# Patient Record
Sex: MALE | Race: BLACK OR AFRICAN AMERICAN | Employment: FULL TIME | URBAN - METROPOLITAN AREA
[De-identification: names, ages, dates, MRNs, and addresses within clinical notes are randomized per-mention and may not be internally consistent; named-entity substitution may affect disease eponyms.]

---

## 2018-10-12 ENCOUNTER — HOSPITAL ENCOUNTER (EMERGENCY)
Age: 28
Discharge: HOME | End: 2018-10-12

## 2018-10-12 VITALS
HEIGHT: 71 IN | WEIGHT: 156 LBS | HEART RATE: 78 BPM | SYSTOLIC BLOOD PRESSURE: 122 MMHG | DIASTOLIC BLOOD PRESSURE: 67 MMHG | OXYGEN SATURATION: 96 % | TEMPERATURE: 98.4 F | BODY MASS INDEX: 21.84 KG/M2 | RESPIRATION RATE: 14 BRPM

## 2018-10-12 DIAGNOSIS — Z20.2 STD EXPOSURE: Primary | ICD-10-CM

## 2018-10-12 PROCEDURE — 6370000000 HC RX 637 (ALT 250 FOR IP): Performed by: PHYSICIAN ASSISTANT

## 2018-10-12 PROCEDURE — 99282 EMERGENCY DEPT VISIT SF MDM: CPT

## 2018-10-12 PROCEDURE — 6360000002 HC RX W HCPCS: Performed by: PHYSICIAN ASSISTANT

## 2018-10-12 PROCEDURE — 96372 THER/PROPH/DIAG INJ SC/IM: CPT

## 2018-10-12 PROCEDURE — 2500000003 HC RX 250 WO HCPCS: Performed by: PHYSICIAN ASSISTANT

## 2018-10-12 RX ORDER — AZITHROMYCIN 250 MG/1
1000 TABLET, FILM COATED ORAL ONCE
Status: COMPLETED | OUTPATIENT
Start: 2018-10-12 | End: 2018-10-12

## 2018-10-12 RX ADMIN — CEFTRIAXONE SODIUM 250 MG: 250 INJECTION, POWDER, FOR SOLUTION INTRAMUSCULAR; INTRAVENOUS at 14:04

## 2018-10-12 RX ADMIN — AZITHROMYCIN 1000 MG: 250 TABLET, FILM COATED ORAL at 14:03

## 2018-12-18 ENCOUNTER — HOSPITAL ENCOUNTER (EMERGENCY)
Age: 28
Discharge: HOME | End: 2018-12-18
Attending: EMERGENCY MEDICINE

## 2018-12-18 VITALS
RESPIRATION RATE: 18 BRPM | DIASTOLIC BLOOD PRESSURE: 88 MMHG | TEMPERATURE: 98 F | BODY MASS INDEX: 21.84 KG/M2 | OXYGEN SATURATION: 99 % | HEIGHT: 71 IN | WEIGHT: 156 LBS | HEART RATE: 76 BPM | SYSTOLIC BLOOD PRESSURE: 122 MMHG

## 2018-12-18 DIAGNOSIS — K52.9 GASTROENTERITIS: Primary | ICD-10-CM

## 2018-12-18 LAB
ALBUMIN SERPL-MCNC: 5.2 GM/DL (ref 3.4–5)
ALP BLD-CCNC: 77 IU/L (ref 40–128)
ALT SERPL-CCNC: 21 U/L (ref 10–40)
ANION GAP SERPL CALCULATED.3IONS-SCNC: 15 MMOL/L (ref 4–16)
AST SERPL-CCNC: 26 IU/L (ref 15–37)
BASOPHILS ABSOLUTE: 0 K/CU MM
BASOPHILS RELATIVE PERCENT: 0.4 % (ref 0–1)
BILIRUB SERPL-MCNC: 1 MG/DL (ref 0–1)
BUN BLDV-MCNC: 12 MG/DL (ref 6–23)
CALCIUM SERPL-MCNC: 10 MG/DL (ref 8.3–10.6)
CHLORIDE BLD-SCNC: 100 MMOL/L (ref 99–110)
CO2: 24 MMOL/L (ref 21–32)
CREAT SERPL-MCNC: 1 MG/DL (ref 0.9–1.3)
DIFFERENTIAL TYPE: ABNORMAL
EOSINOPHILS ABSOLUTE: 0.1 K/CU MM
EOSINOPHILS RELATIVE PERCENT: 0.8 % (ref 0–3)
GFR AFRICAN AMERICAN: >60 ML/MIN/1.73M2
GFR NON-AFRICAN AMERICAN: >60 ML/MIN/1.73M2
GLUCOSE BLD-MCNC: 98 MG/DL (ref 70–99)
HCT VFR BLD CALC: 47 % (ref 42–52)
HEMOGLOBIN: 15.7 GM/DL (ref 13.5–18)
IMMATURE NEUTROPHIL %: 0.3 % (ref 0–0.43)
LIPASE: 16 IU/L (ref 13–60)
LYMPHOCYTES ABSOLUTE: 0.6 K/CU MM
LYMPHOCYTES RELATIVE PERCENT: 8.7 % (ref 24–44)
MCH RBC QN AUTO: 31.3 PG (ref 27–31)
MCHC RBC AUTO-ENTMCNC: 33.4 % (ref 32–36)
MCV RBC AUTO: 93.6 FL (ref 78–100)
MONOCYTES ABSOLUTE: 0.6 K/CU MM
MONOCYTES RELATIVE PERCENT: 8.6 % (ref 0–4)
NUCLEATED RBC %: 0 %
PDW BLD-RTO: 11.9 % (ref 11.7–14.9)
PLATELET # BLD: 220 K/CU MM (ref 140–440)
PMV BLD AUTO: 10.8 FL (ref 7.5–11.1)
POTASSIUM SERPL-SCNC: 3.5 MMOL/L (ref 3.5–5.1)
RBC # BLD: 5.02 M/CU MM (ref 4.6–6.2)
SEGMENTED NEUTROPHILS ABSOLUTE COUNT: 5.9 K/CU MM
SEGMENTED NEUTROPHILS RELATIVE PERCENT: 81.2 % (ref 36–66)
SODIUM BLD-SCNC: 139 MMOL/L (ref 135–145)
TOTAL IMMATURE NEUTOROPHIL: 0.02 K/CU MM
TOTAL NUCLEATED RBC: 0 K/CU MM
TOTAL PROTEIN: 8.5 GM/DL (ref 6.4–8.2)
WBC # BLD: 7.2 K/CU MM (ref 4–10.5)

## 2018-12-18 PROCEDURE — 96375 TX/PRO/DX INJ NEW DRUG ADDON: CPT

## 2018-12-18 PROCEDURE — 99284 EMERGENCY DEPT VISIT MOD MDM: CPT

## 2018-12-18 PROCEDURE — 96376 TX/PRO/DX INJ SAME DRUG ADON: CPT

## 2018-12-18 PROCEDURE — 80053 COMPREHEN METABOLIC PANEL: CPT

## 2018-12-18 PROCEDURE — S0028 INJECTION, FAMOTIDINE, 20 MG: HCPCS | Performed by: EMERGENCY MEDICINE

## 2018-12-18 PROCEDURE — 2580000003 HC RX 258: Performed by: EMERGENCY MEDICINE

## 2018-12-18 PROCEDURE — 96361 HYDRATE IV INFUSION ADD-ON: CPT

## 2018-12-18 PROCEDURE — 6360000002 HC RX W HCPCS: Performed by: EMERGENCY MEDICINE

## 2018-12-18 PROCEDURE — 6370000000 HC RX 637 (ALT 250 FOR IP): Performed by: EMERGENCY MEDICINE

## 2018-12-18 PROCEDURE — 83690 ASSAY OF LIPASE: CPT

## 2018-12-18 PROCEDURE — 85025 COMPLETE CBC W/AUTO DIFF WBC: CPT

## 2018-12-18 PROCEDURE — 96374 THER/PROPH/DIAG INJ IV PUSH: CPT

## 2018-12-18 PROCEDURE — 2500000003 HC RX 250 WO HCPCS: Performed by: EMERGENCY MEDICINE

## 2018-12-18 RX ORDER — 0.9 % SODIUM CHLORIDE 0.9 %
1000 INTRAVENOUS SOLUTION INTRAVENOUS ONCE
Status: COMPLETED | OUTPATIENT
Start: 2018-12-18 | End: 2018-12-18

## 2018-12-18 RX ORDER — ONDANSETRON 2 MG/ML
4 INJECTION INTRAMUSCULAR; INTRAVENOUS EVERY 30 MIN PRN
Status: COMPLETED | OUTPATIENT
Start: 2018-12-18 | End: 2018-12-18

## 2018-12-18 RX ORDER — BENZONATATE 100 MG/1
200 CAPSULE ORAL ONCE
Status: COMPLETED | OUTPATIENT
Start: 2018-12-18 | End: 2018-12-18

## 2018-12-18 RX ORDER — ONDANSETRON 4 MG/1
4 TABLET, ORALLY DISINTEGRATING ORAL EVERY 8 HOURS PRN
Qty: 15 TABLET | Refills: 0 | Status: SHIPPED | OUTPATIENT
Start: 2018-12-18 | End: 2019-05-02

## 2018-12-18 RX ORDER — FAMOTIDINE 20 MG/1
20 TABLET, FILM COATED ORAL 2 TIMES DAILY
Qty: 40 TABLET | Refills: 1 | Status: SHIPPED | OUTPATIENT
Start: 2018-12-18

## 2018-12-18 RX ORDER — HYDROCODONE BITARTRATE AND ACETAMINOPHEN 5; 325 MG/1; MG/1
1 TABLET ORAL ONCE
Status: COMPLETED | OUTPATIENT
Start: 2018-12-18 | End: 2018-12-18

## 2018-12-18 RX ORDER — MAGNESIUM HYDROXIDE/ALUMINUM HYDROXICE/SIMETHICONE 120; 1200; 1200 MG/30ML; MG/30ML; MG/30ML
30 SUSPENSION ORAL ONCE
Status: COMPLETED | OUTPATIENT
Start: 2018-12-18 | End: 2018-12-18

## 2018-12-18 RX ADMIN — ONDANSETRON 4 MG: 2 INJECTION INTRAMUSCULAR; INTRAVENOUS at 02:22

## 2018-12-18 RX ADMIN — BENZONATATE 200 MG: 100 CAPSULE ORAL at 04:05

## 2018-12-18 RX ADMIN — ONDANSETRON 4 MG: 2 INJECTION INTRAMUSCULAR; INTRAVENOUS at 05:50

## 2018-12-18 RX ADMIN — ALUMINUM HYDROXIDE, MAGNESIUM HYDROXIDE, AND SIMETHICONE 30 ML: 200; 200; 20 SUSPENSION ORAL at 02:55

## 2018-12-18 RX ADMIN — FAMOTIDINE 20 MG: 10 INJECTION, SOLUTION INTRAVENOUS at 02:55

## 2018-12-18 RX ADMIN — ONDANSETRON 4 MG: 2 INJECTION INTRAMUSCULAR; INTRAVENOUS at 04:05

## 2018-12-18 RX ADMIN — HYDROCODONE BITARTRATE AND ACETAMINOPHEN 1 TABLET: 5; 325 TABLET ORAL at 04:05

## 2018-12-18 RX ADMIN — SODIUM CHLORIDE 1000 ML: 9 INJECTION, SOLUTION INTRAVENOUS at 02:55

## 2018-12-18 ASSESSMENT — PAIN DESCRIPTION - LOCATION: LOCATION: ABDOMEN

## 2018-12-18 ASSESSMENT — PAIN SCALES - GENERAL
PAINLEVEL_OUTOF10: 8
PAINLEVEL_OUTOF10: 8

## 2018-12-18 ASSESSMENT — PAIN DESCRIPTION - PAIN TYPE: TYPE: ACUTE PAIN

## 2018-12-18 NOTE — ED PROVIDER NOTES
specified. Disposition medications (if applicable):  Discharge Medication List as of 12/18/2018  6:12 AM      START taking these medications    Details   ondansetron (ZOFRAN ODT) 4 MG disintegrating tablet Take 1 tablet by mouth every 8 hours as needed for Nausea, Disp-15 tablet, R-0Print      famotidine (PEPCID) 20 MG tablet Take 1 tablet by mouth 2 times daily, Disp-40 tablet, R-1Print             Comment: Please note this report has been produced using speech recognition software and may contain errors related to that system including errors in grammar, punctuation, and spelling, as well as words and phrases that may be inappropriate. If there are any questions or concerns please feel free to contact the dictating provider for clarification.      Franklin Villarreal MD  12/18/18 5153

## 2019-01-09 ENCOUNTER — HOSPITAL ENCOUNTER (EMERGENCY)
Age: 29
Discharge: HOME | End: 2019-01-09

## 2019-01-09 VITALS
DIASTOLIC BLOOD PRESSURE: 89 MMHG | TEMPERATURE: 98 F | BODY MASS INDEX: 22.33 KG/M2 | WEIGHT: 156 LBS | SYSTOLIC BLOOD PRESSURE: 131 MMHG | OXYGEN SATURATION: 100 % | HEART RATE: 55 BPM | RESPIRATION RATE: 16 BRPM | HEIGHT: 70 IN

## 2019-01-09 DIAGNOSIS — K61.1 PERIRECTAL ABSCESS: Primary | ICD-10-CM

## 2019-01-09 PROCEDURE — 99282 EMERGENCY DEPT VISIT SF MDM: CPT

## 2019-01-09 RX ORDER — CHLORHEXIDINE GLUCONATE 4 G/100ML
SOLUTION TOPICAL
Qty: 1 BOTTLE | Refills: 1 | Status: SHIPPED | OUTPATIENT
Start: 2019-01-09 | End: 2019-01-23

## 2019-01-09 RX ORDER — DOXYCYCLINE 100 MG/1
100 TABLET ORAL 2 TIMES DAILY
Qty: 20 TABLET | Refills: 0 | Status: SHIPPED | OUTPATIENT
Start: 2019-01-09 | End: 2019-01-19

## 2019-01-09 RX ORDER — TRAMADOL HYDROCHLORIDE 50 MG/1
50 TABLET ORAL EVERY 6 HOURS PRN
Qty: 10 TABLET | Refills: 0 | Status: SHIPPED | OUTPATIENT
Start: 2019-01-09 | End: 2019-01-14

## 2019-01-09 ASSESSMENT — PAIN DESCRIPTION - PAIN TYPE: TYPE: ACUTE PAIN

## 2019-01-09 ASSESSMENT — PAIN SCALES - GENERAL: PAINLEVEL_OUTOF10: 6

## 2019-01-09 ASSESSMENT — PAIN DESCRIPTION - LOCATION: LOCATION: BUTTOCKS

## 2019-01-09 ASSESSMENT — PAIN DESCRIPTION - ORIENTATION: ORIENTATION: RIGHT

## 2019-05-02 ENCOUNTER — HOSPITAL ENCOUNTER (EMERGENCY)
Age: 29
Discharge: HOME | End: 2019-05-02
Attending: EMERGENCY MEDICINE

## 2019-05-02 VITALS
HEART RATE: 80 BPM | RESPIRATION RATE: 16 BRPM | OXYGEN SATURATION: 100 % | BODY MASS INDEX: 22.33 KG/M2 | SYSTOLIC BLOOD PRESSURE: 124 MMHG | DIASTOLIC BLOOD PRESSURE: 67 MMHG | HEIGHT: 70 IN | TEMPERATURE: 97.9 F | WEIGHT: 156 LBS

## 2019-05-02 DIAGNOSIS — R19.7 NAUSEA VOMITING AND DIARRHEA: Primary | ICD-10-CM

## 2019-05-02 DIAGNOSIS — R11.2 NAUSEA VOMITING AND DIARRHEA: Primary | ICD-10-CM

## 2019-05-02 LAB
ALBUMIN SERPL-MCNC: 4.9 GM/DL (ref 3.4–5)
ALP BLD-CCNC: 76 IU/L (ref 40–129)
ALT SERPL-CCNC: 19 U/L (ref 10–40)
ANION GAP SERPL CALCULATED.3IONS-SCNC: 17 MMOL/L (ref 4–16)
AST SERPL-CCNC: 22 IU/L (ref 15–37)
BASOPHILS ABSOLUTE: 0 K/CU MM
BASOPHILS RELATIVE PERCENT: 0.1 % (ref 0–1)
BILIRUB SERPL-MCNC: 1.1 MG/DL (ref 0–1)
BUN BLDV-MCNC: 16 MG/DL (ref 6–23)
CALCIUM SERPL-MCNC: 10 MG/DL (ref 8.3–10.6)
CHLORIDE BLD-SCNC: 100 MMOL/L (ref 99–110)
CO2: 20 MMOL/L (ref 21–32)
CREAT SERPL-MCNC: 1 MG/DL (ref 0.9–1.3)
DIFFERENTIAL TYPE: ABNORMAL
EOSINOPHILS ABSOLUTE: 0 K/CU MM
EOSINOPHILS RELATIVE PERCENT: 0.1 % (ref 0–3)
GFR AFRICAN AMERICAN: >60 ML/MIN/1.73M2
GFR NON-AFRICAN AMERICAN: >60 ML/MIN/1.73M2
GLUCOSE BLD-MCNC: 106 MG/DL (ref 70–99)
HCT VFR BLD CALC: 50.9 % (ref 42–52)
HEMOGLOBIN: 16.8 GM/DL (ref 13.5–18)
IMMATURE NEUTROPHIL %: 0.4 % (ref 0–0.43)
LIPASE: 16 IU/L (ref 13–60)
LYMPHOCYTES ABSOLUTE: 0.4 K/CU MM
LYMPHOCYTES RELATIVE PERCENT: 3.7 % (ref 24–44)
MCH RBC QN AUTO: 30.9 PG (ref 27–31)
MCHC RBC AUTO-ENTMCNC: 33 % (ref 32–36)
MCV RBC AUTO: 93.7 FL (ref 78–100)
MONOCYTES ABSOLUTE: 0.5 K/CU MM
MONOCYTES RELATIVE PERCENT: 4.7 % (ref 0–4)
NUCLEATED RBC %: 0 %
PDW BLD-RTO: 12.4 % (ref 11.7–14.9)
PLATELET # BLD: 191 K/CU MM (ref 140–440)
PMV BLD AUTO: 10.7 FL (ref 7.5–11.1)
POTASSIUM SERPL-SCNC: 4.5 MMOL/L (ref 3.5–5.1)
RBC # BLD: 5.43 M/CU MM (ref 4.6–6.2)
SEGMENTED NEUTROPHILS ABSOLUTE COUNT: 9 K/CU MM
SEGMENTED NEUTROPHILS RELATIVE PERCENT: 91 % (ref 36–66)
SODIUM BLD-SCNC: 137 MMOL/L (ref 135–145)
TOTAL IMMATURE NEUTOROPHIL: 0.04 K/CU MM
TOTAL NUCLEATED RBC: 0 K/CU MM
TOTAL PROTEIN: 8.7 GM/DL (ref 6.4–8.2)
WBC # BLD: 9.9 K/CU MM (ref 4–10.5)

## 2019-05-02 PROCEDURE — 2580000003 HC RX 258: Performed by: EMERGENCY MEDICINE

## 2019-05-02 PROCEDURE — 96374 THER/PROPH/DIAG INJ IV PUSH: CPT

## 2019-05-02 PROCEDURE — 36415 COLL VENOUS BLD VENIPUNCTURE: CPT

## 2019-05-02 PROCEDURE — 96361 HYDRATE IV INFUSION ADD-ON: CPT

## 2019-05-02 PROCEDURE — 83690 ASSAY OF LIPASE: CPT

## 2019-05-02 PROCEDURE — 6360000002 HC RX W HCPCS: Performed by: EMERGENCY MEDICINE

## 2019-05-02 PROCEDURE — 80053 COMPREHEN METABOLIC PANEL: CPT

## 2019-05-02 PROCEDURE — 99284 EMERGENCY DEPT VISIT MOD MDM: CPT

## 2019-05-02 PROCEDURE — 85025 COMPLETE CBC W/AUTO DIFF WBC: CPT

## 2019-05-02 RX ORDER — 0.9 % SODIUM CHLORIDE 0.9 %
1000 INTRAVENOUS SOLUTION INTRAVENOUS ONCE
Status: COMPLETED | OUTPATIENT
Start: 2019-05-02 | End: 2019-05-02

## 2019-05-02 RX ORDER — ONDANSETRON 2 MG/ML
4 INJECTION INTRAMUSCULAR; INTRAVENOUS EVERY 30 MIN PRN
Status: DISCONTINUED | OUTPATIENT
Start: 2019-05-02 | End: 2019-05-02

## 2019-05-02 RX ORDER — DICYCLOMINE HYDROCHLORIDE 10 MG/1
10 CAPSULE ORAL 3 TIMES DAILY
Qty: 15 CAPSULE | Refills: 3 | Status: SHIPPED | OUTPATIENT
Start: 2019-05-02

## 2019-05-02 RX ORDER — KETOROLAC TROMETHAMINE 30 MG/ML
30 INJECTION, SOLUTION INTRAMUSCULAR; INTRAVENOUS ONCE
Status: DISCONTINUED | OUTPATIENT
Start: 2019-05-02 | End: 2019-05-02

## 2019-05-02 RX ORDER — ONDANSETRON 2 MG/ML
4 INJECTION INTRAMUSCULAR; INTRAVENOUS ONCE
Status: COMPLETED | OUTPATIENT
Start: 2019-05-02 | End: 2019-05-02

## 2019-05-02 RX ORDER — ONDANSETRON 4 MG/1
4 TABLET, ORALLY DISINTEGRATING ORAL EVERY 8 HOURS PRN
Qty: 15 TABLET | Refills: 0 | Status: SHIPPED | OUTPATIENT
Start: 2019-05-02

## 2019-05-02 RX ADMIN — SODIUM CHLORIDE 1000 ML: 9 INJECTION, SOLUTION INTRAVENOUS at 13:23

## 2019-05-02 RX ADMIN — ONDANSETRON 4 MG: 2 INJECTION INTRAMUSCULAR; INTRAVENOUS at 13:23

## 2019-05-02 ASSESSMENT — PAIN SCALES - GENERAL: PAINLEVEL_OUTOF10: 9

## 2019-05-02 NOTE — ED PROVIDER NOTES
Triage Chief Complaint:   Emesis (with N/V/D onset last night. also c/o generalized weakness)    Jamul:  Iza Jenkins is a 25 y.o. male that presents Nausea vomiting and diarrhea, started this morning, no recent travel or sick contacts. He has crampy pain throughout his abdomen, has body aches, decreased oral intake, taken nothing for treatment    ROS:  General:  No fevers, no chills  Eyes:   no discharge  ENT:  No sore throat, no nasal congestion  Cardiovascular:  No chest pain  Respiratory:  No shortness of breath, no cough  Gastrointestinal:  + pain,  nausea,  vomiting, and diarrhea  Musculoskeletal:  No muscle pain, no joint pain  Skin:  No rash, no pruritis  Neurologic:   no headache  Genitourinary:  No dysuria  Endocrine:  No unexpected weight gain, no unexpected weight loss  Extremities:  no edema, no pain    Past Medical History:   Diagnosis Date    History of fracture of finger     History of wrist fracture      Past Surgical History:   Procedure Laterality Date    ABDOMEN SURGERY      OTHER SURGICAL HISTORY Right [id number]    I and D with irrigation of perianal abcess rt buttock    WRIST TENODESIS       History reviewed. No pertinent family history.   Social History     Socioeconomic History    Marital status: Single     Spouse name: Not on file    Number of children: Not on file    Years of education: Not on file    Highest education level: Not on file   Occupational History    Not on file   Social Needs    Financial resource strain: Not on file    Food insecurity:     Worry: Not on file     Inability: Not on file    Transportation needs:     Medical: Not on file     Non-medical: Not on file   Tobacco Use    Smoking status: Current Every Day Smoker     Packs/day: 0.25     Types: Cigarettes    Smokeless tobacco: Never Used   Substance and Sexual Activity    Alcohol use: Yes     Comment: occ    Drug use: Yes     Types: Marijuana     Comment: daily    Sexual activity: Yes   Lifestyle    Physical activity:     Days per week: Not on file     Minutes per session: Not on file    Stress: Not on file   Relationships    Social connections:     Talks on phone: Not on file     Gets together: Not on file     Attends Jewish service: Not on file     Active member of club or organization: Not on file     Attends meetings of clubs or organizations: Not on file     Relationship status: Not on file    Intimate partner violence:     Fear of current or ex partner: Not on file     Emotionally abused: Not on file     Physically abused: Not on file     Forced sexual activity: Not on file   Other Topics Concern    Not on file   Social History Narrative    Not on file     Current Facility-Administered Medications   Medication Dose Route Frequency Provider Last Rate Last Dose    ondansetron (ZOFRAN) injection 4 mg  4 mg Intravenous Q30 Min PRN Link Alonso MD        ketorolac (TORADOL) injection 30 mg  30 mg Intravenous Once Link Alonso MD         Current Outpatient Medications   Medication Sig Dispense Refill    ondansetron (ZOFRAN ODT) 4 MG disintegrating tablet Take 1 tablet by mouth every 8 hours as needed for Nausea 15 tablet 0    dicyclomine (BENTYL) 10 MG capsule Take 1 capsule by mouth 3 times daily As needed for abdominal pain 15 capsule 3    famotidine (PEPCID) 20 MG tablet Take 1 tablet by mouth 2 times daily 40 tablet 1     No Known Allergies    Nursing Notes Reviewed    Physical Exam:  ED Triage Vitals [05/02/19 1241]   Enc Vitals Group      /80      Pulse 81      Resp 18      Temp 97.9 °F (36.6 °C)      Temp Source Oral      SpO2 100 %      Weight 156 lb (70.8 kg)      Height 5' 10\" (1.778 m)      Head Circumference       Peak Flow       Pain Score       Pain Loc       Pain Edu? Excl. in [address]? My pulse ox interpretation is - normal    General appearance:  No acute distress. Skin:  Warm. Dry. Eye:  Extraocular movements intact.      Ears, nose, mouth and throat:  Oral mucosa moist   Neck:  Trachea midline. Extremity:   Normal ROM     Heart:  Regular  Perfusion:  intact  Respiratory: Respirations nonlabored. Abdominal:  Normal bowel sounds. Soft. Nontender. Non distended. Back:  No CVA tenderness to palpation             Neurological:  Alert and oriented times 3.     I have reviewed and interpreted all of the currently available lab results from this visit (if applicable):  Results for orders placed or performed during the hospital encounter of 05/02/19   CBC Auto Differential   Result Value Ref Range    WBC 9.9 4.0 - 10.5 K/CU MM    RBC 5.43 4.6 - 6.2 M/CU MM    Hemoglobin 16.8 13.5 - 18.0 GM/DL    Hematocrit 50.9 42 - 52 %    MCV 93.7 78 - 100 FL    MCH 30.9 27 - 31 PG    MCHC 33.0 32.0 - 36.0 %    RDW 12.4 11.7 - 14.9 %    Platelets 386 466 - 201 K/CU MM    MPV 10.7 7.5 - 11.1 FL    Differential Type AUTOMATED DIFFERENTIAL     Segs Relative 91.0 (H) 36 - 66 %    Lymphocytes % 3.7 (L) 24 - 44 %    Monocytes % 4.7 (H) 0 - 4 %    Eosinophils % 0.1 0 - 3 %    Basophils % 0.1 0 - 1 %    Segs Absolute 9.0 K/CU MM    Lymphocytes # 0.4 K/CU MM    Monocytes # 0.5 K/CU MM    Eosinophils # 0.0 K/CU MM    Basophils # 0.0 K/CU MM    Nucleated RBC % 0.0 %    Total Nucleated RBC 0.0 K/CU MM    Total Immature Neutrophil 0.04 K/CU MM    Immature Neutrophil % 0.4 0 - 0.43 %   CMP   Result Value Ref Range    Sodium 137 135 - 145 MMOL/L    Potassium 4.5 3.5 - 5.1 MMOL/L    Chloride 100 99 - 110 mMol/L    CO2 20 (L) 21 - 32 MMOL/L    BUN 16 6 - 23 MG/DL    CREATININE 1.0 0.9 - 1.3 MG/DL    Glucose 106 (H) 70 - 99 MG/DL    Calcium 10.0 8.3 - 10.6 MG/DL    Alb 4.9 3.4 - 5.0 GM/DL    Total Protein 8.7 (H) 6.4 - 8.2 GM/DL    Total Bilirubin 1.1 (H) 0.0 - 1.0 MG/DL    ALT 19 10 - 40 U/L    AST 22 15 - 37 IU/L    Alkaline Phosphatase 76 40 - 129 IU/L    GFR Non-African American >60 >60 mL/min/1.73m2    GFR African American >60 >60 mL/min/1.73m2    Anion Gap 17 (H) 4 - 16   Lipase   Result Value Ref Range    Lipase 16 13 - 60 IU/L        MDM:  Patient presented with nausea vomiting and diarrhea. Abdominal exam nonsurgical and nonfocal.  Patient is afebrile and nontoxic-appearing. At this point presentation appears most consistent with a gastrointestinal illness versus another process early in its course. I do not have a definitive cause for the patient's symptoms, no indication for further workup at this time based on patient's history, physical exam, and presentation. Patient had labs sent, results not emergent, IV placed was given fluids and medications, is feeling better. I do believe that the patient can be treated symptomatically as an outpatient at this time. Patient will be discharged home, he/she was instructed on treatment, monitoring and outpatient followup. The patient was told that if he/she cannot follow up as an outpatient in the discussed time period, they are to return to the ED for reevaluation. Patient understands and agrees with the plan, return warnings given. Clinical Impression:  1. Nausea vomiting and diarrhea      Disposition referral (if applicable):  [facility]  [address]  [phone]  In 2 days      Disposition medications (if applicable):  New Prescriptions    DICYCLOMINE (BENTYL) 10 MG CAPSULE    Take 1 capsule by mouth 3 times daily As needed for abdominal pain    ONDANSETRON (ZOFRAN ODT) 4 MG DISINTEGRATING TABLET    Take 1 tablet by mouth every 8 hours as needed for Nausea       Comment: Please note this report has been produced using speech recognition software and may contain errors related to that system including errors in grammar, punctuation, and spelling, as well as words and phrases that may be inappropriate. If there are any questions or concerns please feel free to contact the dictating provider for clarification.         Taylor Lyman MD  05/02/19 1970

## 2019-05-02 NOTE — ED NOTES
Bed: ED-27  Expected date:   Expected time:   Means of arrival:   Comments:  [facility] [address], RN  05/02/19 2169

## 2019-05-02 NOTE — ED PROVIDER NOTES
I signed up to see this patient, but was called into a CODE [facility]UE for an extended period of time and asked Dr. Nalini Nguyễn to see this patient instead.                  Oneida Eagle MD  05/02/19 0585

## 2021-05-09 ENCOUNTER — HOSPITAL ENCOUNTER (EMERGENCY)
Age: 31
Discharge: HOME | End: 2021-05-09

## 2021-05-09 VITALS
TEMPERATURE: 98.6 F | HEART RATE: 74 BPM | SYSTOLIC BLOOD PRESSURE: 137 MMHG | DIASTOLIC BLOOD PRESSURE: 84 MMHG | HEIGHT: 71 IN | RESPIRATION RATE: 16 BRPM | WEIGHT: 170 LBS | BODY MASS INDEX: 23.8 KG/M2 | OXYGEN SATURATION: 97 %

## 2021-05-09 DIAGNOSIS — L02.01 FACIAL ABSCESS: Primary | ICD-10-CM

## 2021-05-09 PROCEDURE — 6370000000 HC RX 637 (ALT 250 FOR IP): Performed by: PHYSICIAN ASSISTANT

## 2021-05-09 PROCEDURE — 99284 EMERGENCY DEPT VISIT MOD MDM: CPT

## 2021-05-09 PROCEDURE — 10060 I&D ABSCESS SIMPLE/SINGLE: CPT

## 2021-05-09 RX ORDER — CEPHALEXIN 500 MG/1
500 CAPSULE ORAL 4 TIMES DAILY
Qty: 28 CAPSULE | Refills: 0 | Status: SHIPPED | OUTPATIENT
Start: 2021-05-09 | End: 2021-05-16

## 2021-05-09 RX ORDER — SULFAMETHOXAZOLE AND TRIMETHOPRIM 800; 160 MG/1; MG/1
1 TABLET ORAL ONCE
Status: COMPLETED | OUTPATIENT
Start: 2021-05-09 | End: 2021-05-09

## 2021-05-09 RX ORDER — CEPHALEXIN 250 MG/1
500 CAPSULE ORAL ONCE
Status: COMPLETED | OUTPATIENT
Start: 2021-05-09 | End: 2021-05-09

## 2021-05-09 RX ORDER — SULFAMETHOXAZOLE AND TRIMETHOPRIM 800; 160 MG/1; MG/1
1 TABLET ORAL 2 TIMES DAILY
Qty: 14 TABLET | Refills: 0 | Status: SHIPPED | OUTPATIENT
Start: 2021-05-09 | End: 2021-05-16

## 2021-05-09 RX ADMIN — CEPHALEXIN 500 MG: 250 CAPSULE ORAL at 01:47

## 2021-05-09 RX ADMIN — SULFAMETHOXAZOLE AND TRIMETHOPRIM 1 TABLET: 800; 160 TABLET ORAL at 01:47

## 2021-05-09 RX ADMIN — Medication 3 ML: at 01:47

## 2021-05-09 ASSESSMENT — PAIN SCALES - GENERAL: PAINLEVEL_OUTOF10: 8

## 2021-05-09 NOTE — ED PROVIDER NOTES
Emergency [address] EMERGENCY DEPARTMENT    Patient: Alessandra Ortiz  MRN: [id number]  : 1990  Date of Evaluation: 2021  ED Provider: Iman Barillas PA-C    Chief Complaint       Chief Complaint   Patient presents with    Abscess     x 1 year       Minna Power is a 26 y.o. male who presents to the emergency department for a facial abscess. Onset was about a week ago. Localized to the right mandibular area. Patient states he has tried to squeeze it without relief. States he has now noted another area of swelling to the side of the neck. Denies fever. Denies n/v.  He reports he has had abscesses to the buttocks in the past but never to his face. ROS     CONSTITUTIONAL:  Denies fever. GI:  Denies nausea or vomiting. INTEGUMENT:  + facial abscess. LYMPHATIC:  + LAD.     Past History     Past Medical History:   Diagnosis Date    History of fracture of finger     History of wrist fracture      Past Surgical History:   Procedure Laterality Date    ABDOMEN SURGERY      OTHER SURGICAL HISTORY Right [id number]    I and D with irrigation of perianal abcess rt buttock    WRIST TENODESIS       Social History     Socioeconomic History    Marital status: Single     Spouse name: None    Number of children: None    Years of education: None    Highest education level: None   Occupational History    None   Social Needs    Financial resource strain: None    Food insecurity     Worry: None     Inability: None    Transportation needs     Medical: None     Non-medical: None   Tobacco Use    Smoking status: Current Every Day Smoker     Packs/day: 0.25     Types: Cigarettes    Smokeless tobacco: Never Used   Substance and Sexual Activity    Alcohol use: Yes     Comment: occ    Drug use: Yes     Types: Marijuana     Comment: daily    Sexual activity: Yes   Lifestyle    Physical activity     Days per week: None     Minutes greatest area of fluctuance and approximately 0.25 cc purulent material was aspirated. Additional purulent material was expressed from the incision site with manual pressure. The patient tolerated the procedure well. No immediate complications. ED Course and MDM   -  Patient seen and evaluated in the emergency department. -  Triage and nursing notes reviewed and incorporated. -  Old chart records reviewed and incorporated. -  Supervising physician was Dr. Ignacio Wang. Patient was seen independently. -  I&D performed as noted above. -  First dose of Bactrim and Keflex given in the ED.  -  Disposition:  Home. Rx Bactrim, Keflex. Instructed on warm compresses to the site. FU with Primary Care, return here as needed. He is agreeable with plan of care and disposition. In light of current events, I did utilize appropriate PPE (including N95 and surgical face mask, safety glasses, and gloves, as recommended by the health facility/national standard best practice, during my bedside interactions with the patient. Final Impression      1.  Facial abscess          DISPOSITION        Enedinatasha Yamilka, [address] [place] [address]  05/09/21 8244

## 2021-05-09 NOTE — ED TRIAGE NOTES
Pt presents to the ED c/o an abscess to the right side of his jaw. States it has been there for a week, there is a slightly smaller one a little further back that has been there for a week in a half. Pt is alert and oriented, rates pain 8/10.

## 2021-05-10 ENCOUNTER — HOSPITAL ENCOUNTER (EMERGENCY)
Age: 31
Discharge: HOME | End: 2021-05-10

## 2021-05-10 VITALS
HEART RATE: 84 BPM | TEMPERATURE: 98 F | SYSTOLIC BLOOD PRESSURE: 128 MMHG | WEIGHT: 170 LBS | BODY MASS INDEX: 23.8 KG/M2 | HEIGHT: 71 IN | RESPIRATION RATE: 16 BRPM | OXYGEN SATURATION: 100 % | DIASTOLIC BLOOD PRESSURE: 84 MMHG

## 2021-05-10 DIAGNOSIS — L02.01 FACIAL ABSCESS: Primary | ICD-10-CM

## 2021-05-10 PROCEDURE — 87077 CULTURE AEROBIC IDENTIFY: CPT

## 2021-05-10 PROCEDURE — 2500000003 HC RX 250 WO HCPCS: Performed by: PHYSICIAN ASSISTANT

## 2021-05-10 PROCEDURE — 87186 SC STD MICRODIL/AGAR DIL: CPT

## 2021-05-10 PROCEDURE — 87075 CULTR BACTERIA EXCEPT BLOOD: CPT

## 2021-05-10 PROCEDURE — 87070 CULTURE OTHR SPECIMN AEROBIC: CPT

## 2021-05-10 PROCEDURE — 99283 EMERGENCY DEPT VISIT LOW MDM: CPT

## 2021-05-10 PROCEDURE — 10060 I&D ABSCESS SIMPLE/SINGLE: CPT

## 2021-05-10 RX ORDER — LIDOCAINE HYDROCHLORIDE 20 MG/ML
5 INJECTION, SOLUTION EPIDURAL; INFILTRATION; INTRACAUDAL; PERINEURAL ONCE
Status: COMPLETED | OUTPATIENT
Start: 2021-05-10 | End: 2021-05-10

## 2021-05-10 RX ADMIN — LIDOCAINE HYDROCHLORIDE 5 ML: 20 INJECTION, SOLUTION EPIDURAL; INFILTRATION; INTRACAUDAL at 12:35

## 2021-05-10 ASSESSMENT — PAIN SCALES - GENERAL: PAINLEVEL_OUTOF10: 8

## 2021-05-10 ASSESSMENT — PAIN DESCRIPTION - PAIN TYPE: TYPE: ACUTE PAIN

## 2021-05-10 NOTE — ED NOTES
Discharge instructions reviewed with pt. All questions answered at this time. Pt verbalized understanding.       Abdiel Castro RN  05/10/21 8662

## 2021-05-10 NOTE — ED PROVIDER NOTES
developed, well nourished, no acute distress, non-toxic appearance   HENT: Right lower jawline with area of erythema and swelling measuring proximally 1 to 2 cm x 1 to 2 cm. Area is fluctuant. Mild preauricular lymphadenopathy and submandibular lymphadenopathy. No trismus. Posterior oropharynx is clear. No palpable masses intraorally. Respiratory:  No respiratory distress, normal breath sounds   Cardiovascular:  Normal rate, normal rhythm  GI:  Soft, nondistended, nontender  Musculoskeletal:  No edema, no tenderness, no deformities. Integument:  Right lower jawline with area of erythema and swelling measuring proximally 1 to 2 cm x 1 to 2 cm. Area is fluctuant. Mild preauricular lymphadenopathy and submandibular lymphadenopathy. No trismus. Posterior oropharynx is clear. No palpable masses intraorally. Neurological: alert and oriented, no focal deficits         ________________________________________________________________________    PROCEDURES: Incision and Drainage Procedure Note    Indication: Cutaneous Abscess    Procedure:    - Procedure explained, including risks and benefits explained to the patient who expressed understanding. All questions were answered. Verbal consent obtained. - The patient was positioned appropriately and Area was prepped and draped in the usual sterile fashion using Betadine.     - The affected area was anesthetized using 2% lidocaine using 5 mL. - Area of greatest induration was incised utilizing a #11 blade - incision length was approximately 1 cm. - Wound culture was obtained and sent to lab. - moderate amount of pus mixed with blood was expressed. Loculations were broken up using a blunt probe technique. Cavity was irrigated with sterile saline. The patient tolerated the procedure well without complication.     ________________________________________________________________________      ED COURSE & MEDICAL DECISION MAKING      Patient presents as above. Patient is well-appearing, nontoxic. Patient returned due to the abscess not draining anymore per recommendation of previous provider. Patient has prescription for Bactrim DS and Keflex. See above procedure note for incision and drainage. Recommend follow-up with primary care provider or ENT in 2 to 3 days for wound check. I discussed signs of worsening infection return immediately if these develop. Recommend patient continue previously prescribed antibiotics. Clinical  IMPRESSION    Facial abscess      Diagnosis and plan discussed in detail with patient who understands and agrees. Patient agrees to return emergency department if symptoms worsen or any new symptoms develop. Comment: Please note this report has been produced using speech recognition software and may contain errors related to that system including errors in grammar, punctuation, and spelling, as well as words and phrases that may be inappropriate. If there are any questions or concerns please feel free to contact the dictating provider for clarification.         Nikki Storey PA-C  05/10/21 1300

## 2021-05-15 LAB
CULTURE: ABNORMAL
CULTURE: ABNORMAL
Lab: ABNORMAL
SPECIMEN: ABNORMAL

## 2021-09-22 ENCOUNTER — HOSPITAL ENCOUNTER (EMERGENCY)
Age: 31
Discharge: HOME | End: 2021-09-22

## 2021-09-22 VITALS
WEIGHT: 170 LBS | BODY MASS INDEX: 24.34 KG/M2 | DIASTOLIC BLOOD PRESSURE: 73 MMHG | HEART RATE: 63 BPM | SYSTOLIC BLOOD PRESSURE: 141 MMHG | TEMPERATURE: 98.4 F | OXYGEN SATURATION: 97 % | HEIGHT: 70 IN | RESPIRATION RATE: 17 BRPM

## 2021-09-22 DIAGNOSIS — J06.9 VIRAL URI WITH COUGH: Primary | ICD-10-CM

## 2021-09-22 LAB
SARS-COV-2, NAAT: NOT DETECTED
SOURCE: NORMAL

## 2021-09-22 PROCEDURE — 99284 EMERGENCY DEPT VISIT MOD MDM: CPT

## 2021-09-22 PROCEDURE — 87635 SARS-COV-2 COVID-19 AMP PRB: CPT

## 2021-09-22 ASSESSMENT — PAIN DESCRIPTION - PAIN TYPE: TYPE: ACUTE PAIN

## 2021-09-22 ASSESSMENT — PAIN SCALES - GENERAL: PAINLEVEL_OUTOF10: 8

## 2021-09-22 NOTE — ED PROVIDER NOTES
EMERGENCY DEPARTMENT ENCOUNTER      PCP: No primary care provider on file. CHIEF COMPLAINT    Chief Complaint   Patient presents with    Concern For COVID-19     no taste, no smell, body aches         This patient was not evaluated by the attending physician. I have independently evaluated this patient . HPI    Denisse Morfin is a 33 y.o. male who presents with complaints of generalized body aches, dry cough, loss of taste, loss to smell, and fatigue. The patient states his symptoms began yesterday. States he was exposed to someone with COVID-19 at work last week so was tested on Friday. States he got the results yesterday and they were negative. He states his symptoms began after. Complains of generalized body aches he rates as 8/10, aching, and constant. Nothing has alleviated or exacerbated his symptoms. He took a DayQuil. He has had an episode of diarrhea. He denies fever, nausea, vomiting abdominal pain, chest pain, shortness of breath, or other complaints. REVIEW OF SYSTEMS    Constitutional:  Denies fever, chills  HEENT:  See above  Cardiovascular:  Denies chest pain, palpitations.   Respiratory:  Denies shortness of breath or wheezes  GI:  Denies abdominal pain, vomiting, or diarrhea   Neurologic:  Denies confusion, light headedness, dizziness, or syncope   Skin:  No rash    All other review of systems are negative  See HPI and nursing notes for additional information       PAST MEDICAL AND SURGICAL HISTORY    Past Medical History:   Diagnosis Date    History of fracture of finger     History of wrist fracture      Past Surgical History:   Procedure Laterality Date    ABDOMEN SURGERY      OTHER SURGICAL HISTORY Right [id number]    I and D with irrigation of perianal abcess rt buttock    WRIST TENODESIS         CURRENT MEDICATIONS    Current Outpatient Rx   Medication Sig Dispense Refill    ondansetron (ZOFRAN ODT) 4 MG disintegrating tablet Take 1 tablet by mouth every 8 hours as needed for Nausea 15 tablet 0    dicyclomine (BENTYL) 10 MG capsule Take 1 capsule by mouth 3 times daily As needed for abdominal pain 15 capsule 3    famotidine (PEPCID) 20 MG tablet Take 1 tablet by mouth 2 times daily 40 tablet 1       ALLERGIES    No Known Allergies    FAMILY AND SOCIAL HISTORY    History reviewed. No pertinent family history. Social History     Socioeconomic History    Marital status: Single     Spouse name: None    Number of children: None    Years of education: None    Highest education level: None   Occupational History    None   Tobacco Use    Smoking status: Current Every Day Smoker     Packs/day: 0.25     Types: Cigarettes    Smokeless tobacco: Never Used   Vaping Use    Vaping Use: Never used   Substance and Sexual Activity    Alcohol use: Yes     Comment: occ    Drug use: Yes     Types: Marijuana     Comment: daily    Sexual activity: Yes   Other Topics Concern    None   Social History Narrative    None     Social Determinants of Health     Financial Resource Strain:     Difficulty of Paying Living Expenses:    Food Insecurity:     Worried About Running Out of Food in the Last Year:     Ran Out of Food in the Last Year:    Transportation Needs:     Lack of Transportation (Medical):      Lack of Transportation (Non-Medical):    Physical Activity:     Days of Exercise per Week:     Minutes of Exercise per Session:    Stress:     Feeling of Stress :    Social Connections:     Frequency of Communication with Friends and Family:     Frequency of Social Gatherings with Friends and Family:     Attends Spiritism Services:     Active Member of Clubs or Organizations:     Attends Club or Organization Meetings:     Marital Status:    Intimate Partner Violence:     Fear of Current or Ex-Partner:     Emotionally Abused:     Physically Abused:     Sexually Abused:        PHYSICAL EXAM    VITAL SIGNS: BP (!) 141/73   Pulse 63   Temp 98.4 °F (36.9 °C) (Oral)   Resp 17   Ht 5' 10\" (1.778 m)   Wt 170 lb (77.1 kg)   SpO2 97%   BMI 24.39 kg/m²   Constitutional:  Well developed, well nourished, no acute distress, non-toxic appearance   Eyes: Conjunctiva normal, sclera non-icteric  HEENT:     - Normocephalic, atraumatic   - PERRL, EOM intact. Conjunctiva normal    - External auditory canals  clear   - Nasal passages with mildly erythematous and edematous turbinates. No massess. Neck/Lymphatics:    - supple, no JVD, no swollen nodes     Respiratory:     Nonlabored breathing - No retractions, no accessory muscle use   Clear to auscultation bilateral lung fields, no wheezes/rales/rhonchi. Cardiovascular:  Normal rate, normal rhythm   GI:  Soft, no abdominal tenderness, no guarding. Musculoskeletal:  No obvious deficits, no edema   Integument:  Skin pink, warm, dry, intact     LABS:  Results for orders placed or performed during the hospital encounter of 09/22/21   COVID-19, Rapid    Specimen: Nasopharyngeal   Result Value Ref Range    Source NASOPHARYNGEAL SWAB     SARS-CoV-2, NAAT NOT DETECTED NOT DETECTED         ED COURSE & MEDICAL DECISION MAKING      26-year-old male presents emergency department with complaints of loss of taste, loss of smell, generalized body aches, and dry cough. He had been exposed to Covid. Covid swab obtained and negative. We did discuss the possibility of a false negative test.  He was given quarantine instructions and instructed to have a repeat test in 2 days if having continued symptoms. Per Ra He was instructed to alternate Tylenol and ibuprofen as directed for pain and fever, maintain hydration, follow-up with a primary care provider within a week, and return here if any shortness of breath or worsening symptoms. I discussed the likely viral etiology of patient's symptoms with Pt today and recommend symptomatic treatment with increase fluids, rest.   I provided symptomatic treatment.     Patient agrees to return emergency department if symptoms worsen or any new symptoms develop. Vital signs and nursing notes reviewed during ED course. Differential Diagnoses:  Acute Bronchitis, Pneumonia, Airway Obstruction, Upper Respiratory Viral infection, Sinusitis, Pharyngitis, Daphnie-Tonsillar Abscess,        Clinical  IMPRESSION    1. Viral URI with cough          Comment: Please note this report has been produced using speech recognition software and may contain errors related to that system including errors in grammar, punctuation, and spelling, as well as words and phrases that may be inappropriate. If there are any questions or concerns please feel free to contact the dictating provider for clarification.      Jenn Martin, KERWIN - CNP  09/22/21 9298

## 2021-09-22 NOTE — DISCHARGE INSTR - COC
Continuity of Care Form    Patient Name: Angelique Jacobson   :  1990  MRN:  [id number]    Admit date:  2021  Discharge date:  ***    Code Status Order: Prior   Advance Directives:     Admitting Physician:  No admitting provider for patient encounter. PCP: No primary care provider on file.     Discharging Nurse: [facility] Unit/Room#: ED15/ED-15  Discharging Unit Phone Number: ***    Emergency Contact:   Extended Emergency Contact Information  Primary Emergency Contact: Emily Prescott  Address: ADDRESS [address] [place] [address] Phone: [phone]  Relation: Aunt/Uncle    Past Surgical History:  Past Surgical History:   Procedure Laterality Date    ABDOMEN SURGERY      OTHER SURGICAL HISTORY Right [id number]    I and D with irrigation of perianal abcess rt buttock    WRIST TENODESIS         Immunization History:   Immunization History   Administered Date(s) Administered    Tdap (Boostrix, Adacel) 2015       Active Problems:  Patient Active Problem List   Diagnosis Code    Small bowel obstruction, partial ([facility] [address] K56.600    Abdominal pain R10.9    Perirectal abscess K61.1       Isolation/Infection:   Isolation          No Isolation        Patient Infection Status     Infection Onset Added Last Indicated Last Indicated By Review Planned Expiration Resolved Resolved By    None active    Resolved    COVID-19 Rule Out 21 COVID-19, Rapid (Ordered)   21 Rule-Out Test Resulted          Nurse Assessment:  Last Vital Signs: BP (!) 141/73   Pulse 63   Temp 98.4 °F (36.9 °C) (Oral)   Resp 17   Ht 5' 10\" (1.778 m)   Wt 170 lb (77.1 kg)   SpO2 97%   BMI 24.39 kg/m²     Last documented pain score (0-10 scale): Pain Level: 8  Last Weight:   Wt Readings from Last 1 Encounters:   21 170 lb (77.1 kg)     Mental Status:  {IP PT MENTAL STATUS:}    IV Access:  [facility] IV ACCESS:377921152}    Nursing Mobility/ADLs:  Walking   {P DME BEEP:910085784}  Transfer  {CHP DME JESUS:489813302}  Bathing  {CHP DME HYNF:777871098}  Dressing  {CHP DME ATRH:799288860}  Toileting  {CHP DME TSLN:405386372}  Feeding  {P DME WQLZ:114002662}  Med Admin  {P DME CZJJ:992052420}  Med Delivery   {[facility] MED Delivery:892938447}    Wound Care Documentation and Therapy:  Incision 14 Buttocks Right; Lower;Mid (Active)   Number of days: 5842        Elimination:  Continence:   · Bowel: {YES / BY:27427}  · Bladder: {YES / OD:78045}  Urinary Catheter: {Urinary Catheter:132351559}   Colostomy/Ileostomy/Ileal Conduit: {YES / YK:58498}       Date of Last BM: ***  No intake or output data in the 24 hours ending 21 0748  No intake/output data recorded.     Safety Concerns:     [facility] Safety Concerns:647504492}    Impairments/Disabilities:      [facility] Impairments/Disabilities:311733015}    Nutrition Therapy:  Current Nutrition Therapy:   [facility] Diet List:554359934}    Routes of Feeding: {[facility] DME Other Feedings:361122410}  Liquids: {Slp liquid thickness:90268}  Daily Fluid Restriction: {P DME Yes amt example:652896226}  Last Modified Barium Swallow with Video (Video Swallowing Test): {Done Not Done OIHC:784994133}    Treatments at the Time of Hospital Discharge:   Respiratory Treatments: ***  Oxygen Therapy:  {Therapy; copd oxygen:42388}  Ventilator:    { CC Vent ZXCJ:231698975}    Rehab Therapies: {THERAPEUTIC INTERVENTION:8138519687}  Weight Bearing Status/Restrictions: [facility] [employer] Weight Bearin}  Other Medical Equipment (for information only, NOT a DME order):  {EQUIPMENT:539504257}  Other Treatments: ***    Patient's personal belongings (please select all that are sent with patient):  {[facility] DME Belongings:662746897}    RN SIGNATURE:  {Esignature:848735546}    CASE MANAGEMENT/SOCIAL WORK SECTION    Inpatient Status Date: ***    Readmission Risk Assessment Score:  Readmission Risk              Risk of Unplanned Readmission:  0           Discharging to Facility/ Agency   · Name:   · Address:  · Phone:  · Fax:    Dialysis Facility (if applicable)   · Name:  · Address:  · Dialysis Schedule:  · Phone:  · Fax:    / signature: {Esignature:007004461}    PHYSICIAN SECTION    Prognosis: {Prognosis:7888864399}    Condition at Discharge: Dwain Shea Patient Condition:177888537}    Rehab Potential (if transferring to Rehab): {Prognosis:5160737820}    Recommended Labs or Other Treatments After Discharge: ***    Physician Certification: I certify the above information and transfer of Carmelina Aponte  is necessary for the continuing treatment of the diagnosis listed and that he requires {Admit to Appropriate Level of Care:77991} for {GREATER/LESS:094231917} 30 days.      Update Admission H&P: {CHP DME Changes in AYVAO:161172095}    PHYSICIAN SIGNATURE:  {Esignature:155933049}

## 2021-09-22 NOTE — LETTER
[facility] Emergency Department  [address] [place] [address]  Phone: [phone]  Fax: [phone]             September 22, 2021    Patient: Avani Graff   YOB: 1990   Date of Visit: 9/22/2021       To Whom It May Concern:    King Rosanna was seen and treated in our emergency department on 9/22/2021. He may return 9/25/2021.       Sincerely,   Rochelle Moreno RN          Signature:__________________________________

## 2021-09-22 NOTE — ED NOTES
Patient to ED with reports of loss of taste, loss of smell, and body aches. Patient reports he was exposed to [address] at work and his whole household was tested. Patient states he received a negative test result yesterday and so did the rest of his family but now has no taste or smell and is having body aches.       Americo Cortez RN  09/22/21 1944

## 2022-06-08 ENCOUNTER — HOSPITAL ENCOUNTER (EMERGENCY)
Age: 32
Discharge: HOME | End: 2022-06-08
Attending: EMERGENCY MEDICINE

## 2022-06-08 VITALS
TEMPERATURE: 99.4 F | WEIGHT: 170 LBS | SYSTOLIC BLOOD PRESSURE: 140 MMHG | OXYGEN SATURATION: 97 % | BODY MASS INDEX: 24.34 KG/M2 | HEART RATE: 78 BPM | RESPIRATION RATE: 16 BRPM | DIASTOLIC BLOOD PRESSURE: 81 MMHG | HEIGHT: 70 IN

## 2022-06-08 DIAGNOSIS — K08.89 PAIN, DENTAL: Primary | ICD-10-CM

## 2022-06-08 DIAGNOSIS — K02.9 DENTAL CARIES: ICD-10-CM

## 2022-06-08 DIAGNOSIS — K04.7 DENTAL ABSCESS: ICD-10-CM

## 2022-06-08 PROCEDURE — 99283 EMERGENCY DEPT VISIT LOW MDM: CPT

## 2022-06-08 RX ORDER — HYDROCODONE BITARTRATE AND ACETAMINOPHEN 5; 325 MG/1; MG/1
1-2 TABLET ORAL EVERY 6 HOURS PRN
Qty: 10 TABLET | Refills: 0 | Status: SHIPPED | OUTPATIENT
Start: 2022-06-08 | End: 2022-06-11

## 2022-06-08 RX ORDER — PENICILLIN V POTASSIUM 500 MG/1
500 TABLET ORAL 4 TIMES DAILY
Qty: 40 TABLET | Refills: 0 | Status: SHIPPED | OUTPATIENT
Start: 2022-06-08 | End: 2022-06-18

## 2022-06-08 NOTE — ED PROVIDER NOTES
Emergency Department Encounter    Patient: Mercedes Santos  MRN: [id number]  : 1990  Date of Evaluation: 2022  ED Provider:  Linh Barreto MD    Triage Chief Complaint:   Insomnia, Abdominal Pain, and Dental Pain    [place]:  Mercedes Santos is a 33 y.o. male that presents with complaint of dental pain and abscess and draining. Started a few days ago, has not been able to sleep due to the pain. Feels like his stomach is upset from the drainage. No fevers. No jaw swelling. It is the left lower jaw, he has multiple broken teeth, he has been calling multiple different dentists and they cannot get him in and less there is a no-show. He was given a dose of Flexeril by his mom as well as an ibuprofen 800 mg without improvement. Denies other complaints. No vomiting or diarrhea or abdominal pain. No rashes. ROS - see HPI, below listed is current ROS at time of my eval:  10 systems reviewed negative except as above      Past Medical History:   Diagnosis Date    History of fracture of finger     History of wrist fracture      Past Surgical History:   Procedure Laterality Date    ABDOMEN SURGERY      OTHER SURGICAL HISTORY Right [id number]    I and D with irrigation of perianal abcess rt buttock    WRIST TENODESIS       History reviewed. No pertinent family history.   Social History     Socioeconomic History    Marital status: Single     Spouse name: Not on file    Number of children: Not on file    Years of education: Not on file    Highest education level: Not on file   Occupational History    Not on file   Tobacco Use    Smoking status: Current Every Day Smoker     Packs/day: 0.25     Types: Cigarettes    Smokeless tobacco: Never Used   Vaping Use    Vaping Use: Never used   Substance and Sexual Activity    Alcohol use: Yes     Comment: occ    Drug use: Not Currently     Types: Marijuana Curlie Opal)     Comment: daily    Sexual activity: Yes   Other Topics Concern    Not on file   Social History Narrative    Not on file     Social Determinants of Health     Financial Resource Strain:     Difficulty of Paying Living Expenses: Not on file   Food Insecurity:     Worried About Running Out of Food in the Last Year: Not on file    [place]elia of Food in the Last Year: Not on file   Transportation Needs:     Lack of Transportation (Medical): Not on file    Lack of Transportation (Non-Medical): Not on file   Physical Activity:     Days of Exercise per Week: Not on file    Minutes of Exercise per Session: Not on file   Stress:     Feeling of Stress : Not on file   Social Connections:     Frequency of Communication with Friends and Family: Not on file    Frequency of Social Gatherings with Friends and Family: Not on file    Attends Faith Services: Not on file    Active Member of [address] [place] [address] [employer] or Organizations: Not on file    Attends Club or Organization Meetings: Not on file    Marital Status: Not on file   Intimate Partner Violence:     Fear of Current or Ex-Partner: Not on file    Emotionally Abused: Not on file    Physically Abused: Not on file    Sexually Abused: Not on file   Housing Stability:     Unable to Pay for Housing in the Last Year: Not on file    Number of [place] in the Last Year: Not on file    Unstable Housing in the Last Year: Not on file     No current facility-administered medications for this encounter. Current Outpatient Medications   Medication Sig Dispense Refill    HYDROcodone-acetaminophen (NORCO) 5-325 MG per tablet Take 1-2 tablets by mouth every 6 hours as needed for Pain for up to 3 days. 10 tablet 0    penicillin v potassium (VEETID) 500 MG tablet Take 1 tablet by mouth 4 times daily for 10 days 40 tablet 0    benzocaine (ORAJEL) 10 % mucosal gel Take by mouth as needed.  9 g 0    ondansetron (ZOFRAN ODT) 4 MG disintegrating tablet Take 1 tablet by mouth every 8 hours as needed for Nausea 15 tablet 0    dicyclomine (BENTYL) 10 MG capsule Take 1 capsule by mouth 3 times daily As needed for abdominal pain 15 capsule 3    famotidine (PEPCID) 20 MG tablet Take 1 tablet by mouth 2 times daily 40 tablet 1     No Known Allergies    Nursing Notes Reviewed    Physical Exam:  Triage VS:    ED Triage Vitals [06/08/22 0559]   Enc Vitals Group      BP (!) 148/90      Heart Rate 85      Resp 15      Temp 99.4 °F (37.4 °C)      Temp Source Oral      SpO2 97 %      Weight       Height       Head Circumference       Peak Flow       Pain Score       Pain Loc       Pain Edu? Excl. in [address]? My pulse ox interpretation is - normal    General appearance:  No acute distress. Skin:  Warm. Dry. Eye:  Extraocular movements intact. Ears, nose, mouth and throat:  Oral mucosa moist, left lower premolar is broken, multiple broken teeth and caries, does appear to have an abscess, palpating I can palpate fluctuance and it actually is draining already. No jaw swelling or submandibular swelling noted. No sublingual swelling or trismus  Neck:  Trachea midline. Extremity:  No swelling. Normal ROM     Heart:  Regular rate and rhythm  Respiratory:    Respirations nonlabored. Abdominal:    Non distended. Neurological:  Alert and oriented          Psychiatric:  Appropriate    I have reviewed and interpreted all of the currently available lab results from this visit (if applicable):  No results found for this visit on 06/08/22. Radiographs (if obtained):  Radiologist's Report Reviewed:  No results found. EKG (if obtained): (All EKG's are interpreted by myself in the absence of a cardiologist)      MDM:  31yom with history as above, presents with complaint of dental pain and abscess on exam that is already draining. No trismus or signs of deeper tissue infection. Plan for pain control, antibiotics, and given list of dental resources. He is comfortable with this, all questions answered, discharged in stable condition with return precautions.      Clinical Impression:  1. Pain, dental    2. Dental abscess    3. Dental caries      Disposition referral (if applicable):  your dentist          Disposition medications (if applicable):  New Prescriptions    BENZOCAINE (ORAJEL) 10 % MUCOSAL GEL    Take by mouth as needed. HYDROCODONE-ACETAMINOPHEN (NORCO) 5-325 MG PER TABLET    Take 1-2 tablets by mouth every 6 hours as needed for Pain for up to 3 days. PENICILLIN V POTASSIUM (VEETID) 500 MG TABLET    Take 1 tablet by mouth 4 times daily for 10 days     ED Provider Disposition Time  DISPOSITION Decision To Discharge 06/08/2022 06:37:33 AM      Comment: Please note this report has been produced using speech recognition software and may contain errors related to that system including errors in grammar, punctuation, and spelling, as well as words and phrases that may be inappropriate. Efforts were made to edit the dictations.         Lnih Barreto MD  06/08/22 5266

## 2022-06-08 NOTE — ED NOTES
Pt states he has been having tooth pain to left lower jaw and hasnt been able to sleep for the last 4 days. Pt states he took motrin and a flexeril that his mom gave him before coming to the ed.       Aurelio Dykes  06/08/22 9746

## 2022-06-08 NOTE — ED TRIAGE NOTES
Patient states he began having abdominal pain, dental pain, and insomnia approximately 4 days ago. Patient states he believes it is due to an abscess he has on the left side of his gums.

## 2023-05-14 NOTE — LETTER
[facility] Emergency Department  [facility] [address]  Phone: [phone]  Fax: [phone]               May 2, 2019    Patient: Cassie Bliss   YOB: 1990   Date of Visit: 5/2/2019       To Whom It May Concern:    Marena Skiff was seen and treated in our emergency department on 5/2/2019. He may return to work 5/5/2019.        Sincerely,       Marlene Araya RN         Signature:__________________________________
No